# Patient Record
Sex: FEMALE | Race: WHITE | Employment: FULL TIME | URBAN - METROPOLITAN AREA
[De-identification: names, ages, dates, MRNs, and addresses within clinical notes are randomized per-mention and may not be internally consistent; named-entity substitution may affect disease eponyms.]

---

## 2022-04-03 ENCOUNTER — APPOINTMENT (OUTPATIENT)
Dept: GENERAL RADIOLOGY | Age: 27
End: 2022-04-03
Payer: COMMERCIAL

## 2022-04-03 ENCOUNTER — HOSPITAL ENCOUNTER (EMERGENCY)
Age: 27
Discharge: HOME | End: 2022-04-03
Attending: EMERGENCY MEDICINE
Payer: COMMERCIAL

## 2022-04-03 VITALS
HEIGHT: 64 IN | SYSTOLIC BLOOD PRESSURE: 120 MMHG | TEMPERATURE: 98.7 F | RESPIRATION RATE: 16 BRPM | DIASTOLIC BLOOD PRESSURE: 77 MMHG | OXYGEN SATURATION: 100 % | HEART RATE: 104 BPM

## 2022-04-03 DIAGNOSIS — S83.402A SPRAIN OF COLLATERAL LIGAMENT OF LEFT KNEE, INITIAL ENCOUNTER: Primary | ICD-10-CM

## 2022-04-03 PROCEDURE — 73564 X-RAY EXAM KNEE 4 OR MORE: CPT

## 2022-04-03 PROCEDURE — 99285 EMERGENCY DEPT VISIT HI MDM: CPT

## 2022-04-03 PROCEDURE — 6360000002 HC RX W HCPCS: Performed by: HEALTH CARE PROVIDER

## 2022-04-03 PROCEDURE — 96372 THER/PROPH/DIAG INJ SC/IM: CPT

## 2022-04-03 RX ORDER — KETOROLAC TROMETHAMINE 30 MG/ML
30 INJECTION, SOLUTION INTRAMUSCULAR; INTRAVENOUS ONCE
Status: COMPLETED | OUTPATIENT
Start: 2022-04-03 | End: 2022-04-03

## 2022-04-03 RX ADMIN — KETOROLAC TROMETHAMINE 30 MG: 30 INJECTION, SOLUTION INTRAMUSCULAR at 11:07

## 2022-04-03 ASSESSMENT — ENCOUNTER SYMPTOMS
CONSTIPATION: 0
DIARRHEA: 0
SORE THROAT: 0
NAUSEA: 0
SHORTNESS OF BREATH: 0
VOMITING: 0
ABDOMINAL PAIN: 0

## 2022-04-03 ASSESSMENT — PAIN DESCRIPTION - ORIENTATION: ORIENTATION: LEFT

## 2022-04-03 ASSESSMENT — PAIN - FUNCTIONAL ASSESSMENT: PAIN_FUNCTIONAL_ASSESSMENT: 0-10

## 2022-04-03 ASSESSMENT — PAIN DESCRIPTION - PAIN TYPE: TYPE: ACUTE PAIN

## 2022-04-03 ASSESSMENT — PAIN DESCRIPTION - LOCATION: LOCATION: KNEE

## 2022-04-03 ASSESSMENT — PAIN SCALES - GENERAL
PAINLEVEL_OUTOF10: 4
PAINLEVEL_OUTOF10: 4

## 2022-04-03 NOTE — ED PROVIDER NOTES
[facility]  ED  Emergency Department Encounter  Emergency Medicine Resident     Pt Name: Aly Alvarenga  MRN: [id number]  Ryliegfleighton 1995  Date of evaluation: 4/3/22  PCP:  Germaine Cadet MD    [address] [place] [address]       Chief Complaint   Patient presents with    Knee Pain     (L) knee pain       HISTORY OFPRESENT ILLNESS  (Location/Symptom, Timing/Onset, Context/Setting, Quality, Duration, Modifying Lynita Hush.)      Sophia Steel is a 27 y.o. female who presents with pain in the left knee. Patient was in a rugby game yesterday when she sustained an injury to her left knee after being tackled. States she initially felt a pop but was able to walk with rest.  Woke up this morning with severe swelling of the left knee and is unable to bear weight. Denies any PERRL, conjunctiva normal, weakness, paresthesias lower extremities. Denies any previous injuries to that leg or knee. PAST MEDICAL / SURGICAL / SOCIAL / FAMILY HISTORY      has no past medical history on file. Denies any past medical history     has no past surgical history on file.    Denies any past surgical history    Social History     Socioeconomic History    Marital status: Single     Spouse name: Not on file    Number of children: Not on file    Years of education: Not on file    Highest education level: Not on file   Occupational History    Not on file   Tobacco Use    Smoking status: Not on file    Smokeless tobacco: Not on file   Substance and Sexual Activity    Alcohol use: Not on file    Drug use: Not on file    Sexual activity: Not on file   Other Topics Concern    Not on file   Social History Narrative    Not on file     Social Determinants of Health     Financial Resource Strain:     Difficulty of Paying Living Expenses: Not on file   Food Insecurity:     Worried About Running Out of Food in the Last Year: Not on file    Daniel of Food in the Last Year: Not on file   Transportation Needs:     Lack of Transportation (Medical): Not on file    Lack of Transportation (Non-Medical): Not on file   Physical Activity:     Days of Exercise per Week: Not on file    Minutes of Exercise per Session: Not on file   Stress:     Feeling of Stress : Not on file   Social Connections:     Frequency of Communication with Friends and Family: Not on file    Frequency of Social Gatherings with Friends and Family: Not on file    Attends Mormonism Services: Not on file    Active Member of [address] [place] [address] or Organizations: Not on file    Attends Club or Organization Meetings: Not on file    Marital Status: Not on file   Intimate Partner Violence:     Fear of Current or Ex-Partner: Not on file    Emotionally Abused: Not on file    Physically Abused: Not on file    Sexually Abused: Not on file   Housing Stability:     Unable to Pay for Housing in the Last Year: Not on file    Number of [place] in the Last Year: Not on file    Unstable Housing in the Last Year: Not on file       History reviewed. No pertinent family history. Allergies:  Patient has no known allergies. Home Medications:  Prior to Admission medications    Not on File       REVIEW OF SYSTEMS    (2-9 systems for level 4, 10 or more for level 5)      Review of Systems   Constitutional: Negative for chills and fever. HENT: Negative for ear pain, hearing loss and sore throat. Eyes: Negative for visual disturbance. Respiratory: Negative for shortness of breath. Cardiovascular: Negative for chest pain. Gastrointestinal: Negative for abdominal pain, constipation, diarrhea, nausea and vomiting. Genitourinary: Negative for difficulty urinating and dysuria. Musculoskeletal: Negative for myalgias. Pain and swelling in left knee   Neurological: Negative for numbness. Psychiatric/Behavioral: Negative for agitation and confusion. PHYSICAL EXAM   (up to 7 for level 4, 8 or more for level 5)     INITIAL VITALS:    height is 5' 4\" (1.626 m). Her temperature is 98.7 °F (37.1 °C). Her blood pressure is 120/77 and her pulse is 104. Her respiration is 16 and oxygen saturation is 100%. Physical Exam  Vitals and nursing note reviewed. Constitutional:       General: She is not in acute distress. Appearance: Normal appearance. She is well-developed. She is not ill-appearing or diaphoretic. HENT:      Head: Normocephalic and atraumatic. Right Ear: External ear normal.      Left Ear: External ear normal.      Nose: Nose normal.      Mouth/Throat:      Mouth: Mucous membranes are moist.   Eyes:      Extraocular Movements: Extraocular movements intact. Conjunctiva/sclera: Conjunctivae normal.   Neck:      Trachea: No tracheal deviation. Cardiovascular:      Rate and Rhythm: Normal rate and regular rhythm. Pulmonary:      Effort: Pulmonary effort is normal. No respiratory distress. Abdominal:      General: Abdomen is flat. There is no distension. Musculoskeletal:         General: Swelling and tenderness present. No deformity or signs of injury. Normal range of motion. Cervical back: Normal range of motion and neck supple. Comments: Left knee swelling  Lachmen test positive for joint laxity     Skin:     General: Skin is warm and dry. Coloration: Skin is not jaundiced. Findings: No bruising or lesion. Neurological:      General: No focal deficit present. Mental Status: She is alert and oriented to person, place, and time. Mental status is at baseline. Motor: No abnormal muscle tone. DIFFERENTIAL  DIAGNOSIS     PLAN (LABS / IMAGING / EKG):  Orders Placed This Encounter   Procedures    XR KNEE LEFT (MIN 4 VIEWS)       MEDICATIONS ORDERED:  Orders Placed This Encounter   Medications    ketorolac (TORADOL) injection 30 mg       DDX: tib/fib/patella fracture, ligamentous injury    Initial MDM/Plan: 27 y.o. female who presents with pain in the left knee.  At time of initial examination, patient was in no acute distress, nontoxic appearing, and vital signs were stable. Physical exam was pertinent for significant swelling of her left knee. Neurovascularly in tact. No concerns for popliteal artery injury. Plan for XR, analgesia, crutches, JONELLE, ortho follow up. DIAGNOSTIC RESULTS / EMERGENCY DEPARTMENT COURSE / MDM     LABS:  Labs Reviewed - No data to display      RADIOLOGY:  XR KNEE LEFT (MIN 4 VIEWS)    Result Date: 4/3/2022  EXAMINATION: FOUR XRAY VIEWS OF THE LEFT KNEE 4/3/2022 10:57 am COMPARISON: None. HISTORY: ORDERING SYSTEM PROVIDED HISTORY: knee injury TECHNOLOGIST PROVIDED HISTORY: knee injury FINDINGS: Multiple views were obtained of the left knee. No gross fracture. No dislocation. Minimal medial compartment joint space loss. No gross fracture. EMERGENCY DEPARTMENT COURSE:  ED Course as of 04/03/22 1643   Sun Apr 03, 2022   1139 XR KNEE LEFT (MIN 4 VIEWS)  XR negative for any acute fractures. ABIs appropriate. Patient updated on results. Plan for discharge home at this time with crutches, Ace wrap, pain control. Patient will need follow-up with orthopedics for concerns of ligament tear of the left knee. [JS]      ED Course User Index  [JS] Jose Carlos Rice DO       PROCEDURES:  None    CONSULTS:  None    CRITICAL CARE:  Please see attending note    FINAL IMPRESSION      1.  Sprain of collateral ligament of left knee, initial encounter       DISPOSITION / PLAN     DISPOSITION Decision To Discharge 04/03/2022 11:40:23 AM    PATIENTREFERRED TO:  Jonah Blankenship MD  [address]  [phone]    Schedule an appointment as soon as possible for a visit   As needed    [facility]  [address] [address]  [phone]  Go to   As needed, If symptoms worsen    [address] [address]  Schedule an appointment as soon as possible for a visit in 1 day  For evaluation of your left knee injury. DISCHARGE MEDICATIONS:  There are no discharge medications for this patient.       Hiram Ingram DO  EmergencyMedicine Resident    (Please note that portions of this note were completed with a voice recognition program.  Efforts were made to edit the dictations but occasionally words are mis-transcribed.)     Man Zamora DO  Resident  04/03/22 3014

## 2022-04-03 NOTE — ED NOTES
Pt. To ER room 10 via wheelchair from triage  Pt. Presents with (L) knee pain s/p injury from playing rugby yesterday. Pt. States she felt her knee pop and then had episodes of buckling yesterday evening. When pt woke up this morning she was unable to bend her knee and is having trouble bearing weight  Pt.  Denies all other injuries  Vitals stable  Awaiting orders  Will continue to monitor      Zoë Myrick RN  04/03/22 1030

## 2022-04-03 NOTE — ED PROVIDER NOTES
complaint of the pain. Rugby injury last night tackled someone and then someone landed on top of her. Had difficulty bearing weight since this morning and noticed increased swelling. No other injuries. Physical:     height is 5' 4\" (1.626 m). Her temperature is 98.7 °F (37.1 °C). Her blood pressure is 120/77 and her pulse is 104. Her respiration is 16 and oxygen saturation is 100%. 27 y.o. female no acute distress, there is mild to moderate effusion of the knee, negative anterior drawer but there is some increased laxity on Lachman testing normal varus and valgus movement on stress. Popliteal pulse and DP pulse 2+ bilaterally.   Capillary refill less than 2 seconds below the knee    Impression: Knee injury    Plan: X-ray, JONELLE, symptomatic treatment, follow-up with orthopedics      Logan Li MD, [facility] [place]  Attending Emergency Physician         Sonja Gar MD  04/03/22 0785

## 2022-04-05 ENCOUNTER — TELEPHONE (OUTPATIENT)
Dept: ORTHOPEDIC SURGERY | Age: 27
End: 2022-04-05

## 2022-04-05 ENCOUNTER — OFFICE VISIT (OUTPATIENT)
Dept: ORTHOPEDIC SURGERY | Age: 27
End: 2022-04-05
Payer: COMMERCIAL

## 2022-04-05 VITALS — HEIGHT: 64 IN

## 2022-04-05 DIAGNOSIS — S83.005A DISLOCATION OF LEFT PATELLA, INITIAL ENCOUNTER: Primary | ICD-10-CM

## 2022-04-05 PROCEDURE — 99202 OFFICE O/P NEW SF 15 MIN: CPT | Performed by: ORTHOPAEDIC SURGERY

## 2022-04-05 RX ORDER — LEVOTHYROXINE SODIUM 0.1 MG/1
50 TABLET ORAL DAILY
COMMUNITY

## 2022-04-05 RX ORDER — DEXTROAMPHETAMINE SACCHARATE, AMPHETAMINE ASPARTATE MONOHYDRATE, DEXTROAMPHETAMINE SULFATE AND AMPHETAMINE SULFATE 2.5; 2.5; 2.5; 2.5 MG/1; MG/1; MG/1; MG/1
10 CAPSULE, EXTENDED RELEASE ORAL EVERY MORNING
COMMUNITY

## 2022-04-05 NOTE — TELEPHONE ENCOUNTER
Mom came in this afternoon, patient has work note to return to work this Thursday. Mom and patient are asking that work note be extended until next visit. Please call patient, so mom can  work note.

## 2022-04-05 NOTE — LETTER
[facility]  [address] [facility]  Phone: [phone]  Fax: [phone]    Jasmine Lynne MD        April 5, 2022     Patient: Papito Sandhu   YOB: 1995   Date of Visit: 4/5/2022       To Whom It May Concern: It is my medical opinion that Sophia Sandhu may return to work on 04/07/2022 with restrictions of light duty, she is advised to remain at a seated position with light walking as needed. Please excuse her for the date of 04/04/2022. She will be re-evaluated in 2 weeks at my clinic. If you have any questions or concerns, please don't hesitate to call.     Sincerely,        Jasmine Lynne MD

## 2022-04-05 NOTE — PROGRESS NOTES
Chief Complaint   Patient presents with    Knee Injury     left knee injury sunday during rugby game; felt knee pop when another patient landed on her leg   This 46-year-old patient is seen here for an injury she sustained to her left knee on Saturday, 4/2/2022. She was playing rugby and twisted the knee were open and fell onto weight. She had a loud painful pop. She was not able to continue playing. It was next day that she had noticed she could not put any weight on that left leg. Before that she was weightbearing. Also noticed some swelling next day of the knee. She says it has subsided since then. Patient denies having had any previous problems with the knee even when she was a teenager. Since the injury she has been having hard time trying to put any weight on the leg she has been using a knee brace as well as 2 crutches to ambulate. Examination: In supine position she had a hard time bending the knee. There was very minimal effusion. Most of her tenderness was over the MPFL. She did have some tenderness over the medial joint line. Patellar apprehension test was very strongly positive. Lachman test is negative. She did have a significant difficulty trying to flex it beyond about degrees. X-rays: I reviewed the x-rays that showed no fractures or dislocation. In the sunrise view there is a very sharp angle at the medial side and almost flat on the lateral side. Diagnosis: Acute patellar dislocation left knee. Treatment: Given her knee immobilizer. She can take it out to wash herself. She can be weightbearing as tolerated. She has been instructed in quad setting exercises. We will see her again in 2 weeks time. In the meantime patient is not able to get back to work but she will evaluate herself in a week's time if she wants to go back again we will give her a note to that effect. Next visit no x-rays are needed. Is only for clinical evaluation.   She is not doing better then we will start her on physical therapy.

## 2022-04-06 NOTE — TELEPHONE ENCOUNTER
Patient came in to the office to get letter mom requested for patient, she decided to void that letter and return to work on 04/11/2022 with light duty restrictions that was detailed in previous letter given to patient plus will be addressed on FMLA forms sent to HR.

## 2022-04-18 ENCOUNTER — OFFICE VISIT (OUTPATIENT)
Dept: ORTHOPEDIC SURGERY | Age: 27
End: 2022-04-18
Payer: COMMERCIAL

## 2022-04-18 VITALS — BODY MASS INDEX: 25.61 KG/M2 | HEIGHT: 64 IN | WEIGHT: 150 LBS

## 2022-04-18 DIAGNOSIS — S83.242D ACUTE MEDIAL MENISCUS TEAR OF LEFT KNEE, SUBSEQUENT ENCOUNTER: Primary | ICD-10-CM

## 2022-04-18 PROCEDURE — 99212 OFFICE O/P EST SF 10 MIN: CPT | Performed by: ORTHOPAEDIC SURGERY

## 2022-04-18 NOTE — PROGRESS NOTES
Chief Complaint   Patient presents with    Follow-up     Left patallar dislocation 04/02/2022   This patient who had thought initially that she had an acute dislocation of the patella is returning here for follow-up. The patient is given up her crutches. She has been using a knee immobilizer. She says the pain is better. Out of the brace the pain is located over the posteromedial aspect of the knee. She also mentioned that there was some bruising over the posteromedial aspect and some over the anteromedial aspect of the tibia. Anteromedial bruise is still slightly visible. Position there was no effusion and today definitely had tenderness over the course of the posteromedial joint line. 1 position there was no ecchymosis. Flexion of the knee against resistance was normal and painless. The knee is stable. Diagnosis: Medial meniscal tear left knee. Treatment: Arranging for her to have an MRI of the knee. In the meantime she will start quadricep strengthening exercises treatment is definitely about 1.5 cm wasting of the left quadriceps. She may discontinue the immobilizer and this and see her again after the MRI.

## 2022-04-20 ENCOUNTER — TELEPHONE (OUTPATIENT)
Dept: ORTHOPEDIC SURGERY | Age: 27
End: 2022-04-20

## 2022-04-21 ENCOUNTER — TELEPHONE (OUTPATIENT)
Dept: ORTHOPEDIC SURGERY | Age: 27
End: 2022-04-21

## 2022-04-21 NOTE — TELEPHONE ENCOUNTER
Called patient to go over new set of FMLA forms faxed to the office  On 04/21/2022 she stated was going to return to work on 04/22/2022 since that was the original date of return on previous forms. She  did schedule a follow up with Dr. Krish Deutsch to go over the MRI results as well. She was a little reluctant to do so.

## 2022-04-27 ENCOUNTER — OFFICE VISIT (OUTPATIENT)
Dept: ORTHOPEDIC SURGERY | Age: 27
End: 2022-04-27
Payer: COMMERCIAL

## 2022-04-27 VITALS — HEIGHT: 64 IN | BODY MASS INDEX: 25.61 KG/M2 | WEIGHT: 150 LBS

## 2022-04-27 DIAGNOSIS — S83.512D COMPLETE TEAR OF ANTERIOR CRUCIATE LIGAMENT OF LEFT KNEE, SUBSEQUENT ENCOUNTER: Primary | ICD-10-CM

## 2022-04-27 PROCEDURE — 99213 OFFICE O/P EST LOW 20 MIN: CPT | Performed by: ORTHOPAEDIC SURGERY

## 2022-04-27 NOTE — PROGRESS NOTES
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 She had injured her knee in the rugby game. This was about 3 weeks ago. Patient says that she still having some pain on the medial side. She has difficulty doing any swimming activity. She has difficulty with the stairs and also walking long distance. Examination: There was no effusion in the joint she has full motion. Lachman appears positive today. There was some tenderness over the lateral side currently only at 1 time if she has significant pain on palpating the fibular head. At other times withdraws palpating the fibular head she responded with having no pain. X-rays: I went over the MRI findings with the patient showing complete tear of the anterior cruciate ligament minor tear of the anterior horn of the lateral meniscus and contusion of the tibia and posterolateral corner injury. I went over this explaining it on the plastic model. Diagnosis: Anterior cruciate ligament tear left knee. Posterolateral corner injury left knee. Treatment: Discussed about surgical treatment but at the present time she cannot take any time off. She lives on her own. She wants to get back to work as soon as possible. We did go into the prognosis for developing osteoarthritis dated down the line. In the meantime I have given her an ACL brace with 5 degree extension block. I will see her in 3 weeks and hopefully by that time she should be able to return to work. In the meantime avoid all strenuous activities.

## 2022-05-11 ENCOUNTER — OFFICE VISIT (OUTPATIENT)
Dept: ORTHOPEDIC SURGERY | Age: 27
End: 2022-05-11
Payer: COMMERCIAL

## 2022-05-11 VITALS — BODY MASS INDEX: 25.61 KG/M2 | WEIGHT: 150 LBS | HEIGHT: 64 IN

## 2022-05-11 DIAGNOSIS — S83.512D COMPLETE TEAR OF ANTERIOR CRUCIATE LIGAMENT OF LEFT KNEE, SUBSEQUENT ENCOUNTER: Primary | ICD-10-CM

## 2022-05-11 PROCEDURE — 99213 OFFICE O/P EST LOW 20 MIN: CPT | Performed by: ORTHOPAEDIC SURGERY

## 2022-05-11 NOTE — PROGRESS NOTES
Chief Complaint   Patient presents with    Knee Pain     LEFT - ACL TEAR - DISCUSS SURGICAL OPTIONS    With previously diagnosed with ACL tear is seen here because she has made arrangements so that she could go ahead with surgical treatment. At her last visit we had offered surgery but she says she could not get that done right away because she was on her own and had to continue working. I therefore had prescribed an ACL brace. She says she went to the And he told her that he prescribes the brace only postoperatively. He did not take any measurement. The patient found out who the person was being called his office and the person who had seen her was not there and spoke to another orthotist who did agree that she could have the ACL brace preoperatively. And made arrangements to have him see her this afternoon to be fitted with the brace. However then the patient informed me that her sister is going to stay with her and help out and therefore she could go ahead with the surgery and therefore I told her that she does not need to get the ACL brace at this stage. This is because postoperatively I usually do not use a knee brace. Then went through the details of the procedure explaining that difference in the aloe and autograft. We went through her postoperative management. She should be able to return to work in about 4 weeks. This is barring any complications. She will be scheduled for ACL reconstruction using autograft.

## 2022-05-25 ENCOUNTER — ANESTHESIA EVENT (OUTPATIENT)
Dept: OPERATING ROOM | Age: 27
End: 2022-05-25
Payer: COMMERCIAL

## 2022-05-26 ENCOUNTER — HOSPITAL ENCOUNTER (OUTPATIENT)
Age: 27
Setting detail: OUTPATIENT SURGERY
Discharge: HOME | End: 2022-05-26
Attending: ORTHOPAEDIC SURGERY | Admitting: ORTHOPAEDIC SURGERY
Payer: COMMERCIAL

## 2022-05-26 ENCOUNTER — ANESTHESIA (OUTPATIENT)
Dept: OPERATING ROOM | Age: 27
End: 2022-05-26
Payer: COMMERCIAL

## 2022-05-26 VITALS
DIASTOLIC BLOOD PRESSURE: 74 MMHG | SYSTOLIC BLOOD PRESSURE: 117 MMHG | RESPIRATION RATE: 26 BRPM | BODY MASS INDEX: 26.22 KG/M2 | HEART RATE: 105 BPM | TEMPERATURE: 96.8 F | HEIGHT: 63 IN | OXYGEN SATURATION: 99 % | WEIGHT: 148 LBS

## 2022-05-26 LAB — HCG, PREGNANCY URINE (POC): NEGATIVE

## 2022-05-26 PROCEDURE — 2500000003 HC RX 250 WO HCPCS: Performed by: ANESTHESIOLOGY

## 2022-05-26 PROCEDURE — 2580000003 HC RX 258: Performed by: ANESTHESIOLOGY

## 2022-05-26 PROCEDURE — 6360000002 HC RX W HCPCS: Performed by: ANESTHESIOLOGY

## 2022-05-26 PROCEDURE — 81025 URINE PREGNANCY TEST: CPT

## 2022-05-26 PROCEDURE — 64447 NJX AA&/STRD FEMORAL NRV IMG: CPT | Performed by: ANESTHESIOLOGY

## 2022-05-26 RX ORDER — BUPIVACAINE HYDROCHLORIDE 5 MG/ML
INJECTION, SOLUTION EPIDURAL; INTRACAUDAL
Status: COMPLETED | OUTPATIENT
Start: 2022-05-26 | End: 2022-05-26

## 2022-05-26 RX ORDER — MIDAZOLAM HYDROCHLORIDE 2 MG/2ML
2 INJECTION, SOLUTION INTRAMUSCULAR; INTRAVENOUS ONCE
Status: COMPLETED | OUTPATIENT
Start: 2022-05-26 | End: 2022-05-26

## 2022-05-26 RX ORDER — FENTANYL CITRATE 50 UG/ML
100 INJECTION, SOLUTION INTRAMUSCULAR; INTRAVENOUS ONCE
Status: COMPLETED | OUTPATIENT
Start: 2022-05-26 | End: 2022-05-26

## 2022-05-26 RX ORDER — SODIUM CHLORIDE, SODIUM LACTATE, POTASSIUM CHLORIDE, CALCIUM CHLORIDE 600; 310; 30; 20 MG/100ML; MG/100ML; MG/100ML; MG/100ML
INJECTION, SOLUTION INTRAVENOUS CONTINUOUS
Status: DISCONTINUED | OUTPATIENT
Start: 2022-05-26 | End: 2022-05-26 | Stop reason: HOSPADM

## 2022-05-26 RX ADMIN — BUPIVACAINE HYDROCHLORIDE 20 ML: 5 INJECTION, SOLUTION EPIDURAL; INTRACAUDAL; PERINEURAL at 07:56

## 2022-05-26 RX ADMIN — SODIUM CHLORIDE, POTASSIUM CHLORIDE, SODIUM LACTATE AND CALCIUM CHLORIDE: 600; 310; 30; 20 INJECTION, SOLUTION INTRAVENOUS at 07:22

## 2022-05-26 RX ADMIN — MIDAZOLAM HYDROCHLORIDE 2 MG: 1 INJECTION, SOLUTION INTRAMUSCULAR; INTRAVENOUS at 07:56

## 2022-05-26 RX ADMIN — FENTANYL CITRATE 100 MCG: 50 INJECTION INTRAMUSCULAR; INTRAVENOUS at 07:56

## 2022-05-26 NOTE — ANESTHESIA PROCEDURE NOTES
Peripheral Block    Patient location during procedure: pre-op  Start time: 5/26/2022 7:56 AM  End time: 5/26/2022 8:01 AM  Staffing  Performed: anesthesiologist   Anesthesiologist: Brandon Herrera MD  Preanesthetic Checklist  Completed: patient identified, IV checked, site marked, risks and benefits discussed, surgical/procedural consents, equipment checked, pre-op evaluation, timeout performed, anesthesia consent given, oxygen available, monitors applied/VS acknowledged, fire risk safety assessment completed and verbalized and blood product R/B/A discussed and consented  Peripheral Block  Patient position: supine  Prep: ChloraPrep  Patient monitoring: cardiac monitor, continuous pulse ox and IV access  Block type: Femoral  Laterality: left  Injection technique: single-shot  Guidance: ultrasound guided  Local infiltration: bupivacaine  Infiltration strength: 0.13 %  Dose: 10 mL  Provider prep: mask and sterile gloves  Local infiltration: bupivacaine  Needle  Needle type: short-bevel   Needle gauge: 22 G  Needle length: 10 cm  Needle localization: ultrasound guidance  Test dose: negative  Assessment  Injection assessment: negative aspiration for heme, no paresthesia on injection and local visualized surrounding nerve on ultrasound  Slow fractionated injection: yes  Hemodynamics: stableOutcomes: uncomplicated and patient tolerated procedure well  Medications Administered  Bupivacaine (MARCAINE) PF injection 0.5%, 20 mL  Reason for block: post-op pain management and at surgeon's request

## 2022-05-26 NOTE — H&P
Surgical History and Physical Exam    Reason for surgery:  The patient is a 27 y.o. female with left anterior cruciate ligament injury     Past Medical History:    Past Medical History:   Diagnosis Date    ACL tear 05/2022    LT   Dr. Rica Gale Wears contact lenses     Wears glasses     Wellness examination     PCP Dr Ani Landry /  [place] / last visit unknown     Past Surgical History:    History reviewed. No pertinent surgical history. Medications Prior to Admission:   Prior to Admission medications    Medication Sig Start Date End Date Taking? Authorizing Provider   levothyroxine (SYNTHROID) 100 MCG tablet Take 50 mcg by mouth Daily     Historical Provider, MD   amphetamine-dextroamphetamine (ADDERALL XR) 10 MG extended release capsule Take 10 mg by mouth every morning. Historical Provider, MD     Allergies:    Patient has no known allergies. Social History:   Social History     Socioeconomic History    Marital status: Single     Spouse name: None    Number of children: None    Years of education: None    Highest education level: None   Occupational History    None   Tobacco Use    Smoking status: Never Smoker    Smokeless tobacco: Never Used   Vaping Use    Vaping Use: Never used   Substance and Sexual Activity    Alcohol use: Not Currently    Drug use: Never    Sexual activity: None   Other Topics Concern    None   Social History Narrative    None     Social Determinants of Health     Financial Resource Strain:     Difficulty of Paying Living Expenses: Not on file   Food Insecurity:     Worried About Running Out of Food in the Last Year: Not on file    Daniel of Food in the Last Year: Not on file   Transportation Needs:     Lack of Transportation (Medical): Not on file    Lack of Transportation (Non-Medical):  Not on file   Physical Activity:     Days of Exercise per Week: Not on file    Minutes of Exercise per Session: Not on file   Stress:     Feeling of Stress : Not on file   Social Connections:     Frequency of Communication with Friends and Family: Not on file    Frequency of Social Gatherings with Friends and Family: Not on file    Attends Buddhism Services: Not on file    Active Member of Clubs or Organizations: Not on file    Attends Club or Organization Meetings: Not on file    Marital Status: Not on file   Intimate Partner Violence:     Fear of Current or Ex-Partner: Not on file    Emotionally Abused: Not on file    Physically Abused: Not on file    Sexually Abused: Not on file   Housing Stability:     Unable to Pay for Housing in the Last Year: Not on file    Number of [place] in the Last Year: Not on file    Unstable Housing in the Last Year: Not on file     Family History:  Family History   Problem Relation Age of Onset    No Known Problems Mother     High Blood Pressure Father     Arthritis Father        REVIEW OF SYSTEMS:  Constitutional: Negative for fever and chills. Musculoskeletal: Positive for myalgias and joint pain. Skin: Negative for itching and rash. Neurological: Negative for dizziness, sensory change and headaches. Psychiatric/Behavioral: Negative for depression and suicidal ideas. PHYSICAL EXAM:  Blood pressure 110/77, pulse 79, temperature 96.8 °F (36 °C), temperature source Temporal, resp. rate 22, height 5' 3\" (1.6 m), weight 148 lb (67.1 kg), SpO2 98 %. Gen: alert and oriented, NAD, cooperative  Head: normocephalic atraumatic   Cardiovascular: Regular rate, no dependent edema, distal pulses 2+  Respiratory: Chest symmetric, no accessory muscle use, normal respirations  MSK: Left lower extremity: tenderness to palpation about left knee. N-V intact distally    A/P: 27 y.o. female  was evaluated and after discussion surgical and non surgical options, the patient has decided to undergo left anterior cruciate ligament reconstructions. Consent obtained and in chart. All questions answered appropriately. Surgical risks including but not limited to: bleeding, blood clots, infection, damage to surrounding tissues/nerves/vessels, failure of fixation, failure of wounds to heal, loss of motion, stiffness, dislocation, postoperative pain, recurrence of symptoms, need for future surgery,  risks of anesthesia, loss of limb and loss of life were all discussed with the patient. Knowing these risks, the patient wishes to proceed with surgery. -Abx OCTOR  -Site marked, Consent in chart  -AC held  -NPO since midnight  -All question answered.

## 2022-05-26 NOTE — ANESTHESIA PRE PROCEDURE
Department of Anesthesiology  Preprocedure Note       Name:  Real Lawler   Age:  27 y. o.  :  1995                                          MRN:  [id number]         Date:  2022      Surgeon: Renetta Mahajan):  Ren Brock MD    Procedure: Procedure(s):  ARTHROSCOPIC KNEE ACL RECONSTRUCTION WITH BONE TENDON BONE AUTOGRAFT  (ARTHREX, FEMORAL NERVE BLOCK PRE-OP, 3080 TABLE, SUPINE)    Medications prior to admission:   Prior to Admission medications    Medication Sig Start Date End Date Taking? Authorizing Provider   levothyroxine (SYNTHROID) 100 MCG tablet Take 50 mcg by mouth Daily     Historical Provider, MD   amphetamine-dextroamphetamine (ADDERALL XR) 10 MG extended release capsule Take 10 mg by mouth every morning. Historical Provider, MD       Current medications:    No current facility-administered medications for this encounter. Allergies:  No Known Allergies    Problem List:    Patient Active Problem List   Diagnosis Code    Dislocation of left patella S83.005A       Past Medical History:        Diagnosis Date    ACL tear 2022    LT   Dr. Ching Franks Thyroid disease     Wears contact lenses     Wears glasses     Wellness examination     PCP Dr Clint Muhammad /  [place] / last visit unknown       Past Surgical History:  History reviewed. No pertinent surgical history.     Social History:    Social History     Tobacco Use    Smoking status: Never Smoker    Smokeless tobacco: Never Used   Substance Use Topics    Alcohol use: Not Currently                                Counseling given: Not Answered      Vital Signs (Current):   Vitals:    22 0950   Weight: 148 lb (67.1 kg)   Height: 5' 3\" (1.6 m)                                              BP Readings from Last 3 Encounters:   22 120/77       NPO Status:                                                                                 BMI:   Wt Readings from Last 3 Encounters:   22 148 lb (67.1 kg)   22 150 lb (68 kg)   04/27/22 150 lb (68 kg)     Body mass index is 26.22 kg/m². CBC:   Lab Results   Component Value Date    WBC 9.0 03/06/2013    RBC 4.51 03/06/2013    HGB 12.5 03/06/2013    HCT 37.7 03/06/2013    MCV 83.7 03/06/2013    RDW 14.9 03/06/2013     03/06/2013       CMP:   Lab Results   Component Value Date     03/06/2013    K 4.0 03/06/2013     03/06/2013    CO2 29 03/06/2013    BUN 16 03/06/2013    CREATININE 0.88 03/06/2013    GFRAA NOT REPORTED 03/06/2013    LABGLOM  03/06/2013     Pediatric GFR requires additional information. Refer to [facility] website for       POC Tests: No results for input(s): POCGLU, POCNA, POCK, POCCL, POCBUN, POCHEMO, POCHCT in the last 72 hours. Coags: No results found for: PROTIME, INR, APTT    HCG (If Applicable): No results found for: PREGTESTUR, PREGSERUM, HCG, HCGQUANT     ABGs: No results found for: PHART, PO2ART, DVL6ZUV, BSW1AEI, BEART, [id number]     Type & Screen (If Applicable):  No results found for: LABABO, LABRH    Drug/Infectious Status (If Applicable):  No results found for: HIV, HEPCAB    COVID-19 Screening (If Applicable): No results found for: COVID19        Anesthesia Evaluation  Patient summary reviewed and Nursing notes reviewed no history of anesthetic complications:   Airway: Mallampati: I  TM distance: >3 FB   Neck ROM: full  Mouth opening: > = 3 FB   Dental: normal exam         Pulmonary:Negative Pulmonary ROS and normal exam                               Cardiovascular:Negative CV ROS                      Neuro/Psych:   Negative Neuro/Psych ROS              GI/Hepatic/Renal: Neg GI/Hepatic/Renal ROS            Endo/Other:    (+) hypothyroidism::., .                 Abdominal:             Vascular: Other Findings:           Anesthesia Plan      general and regional     ASA 2       Induction: intravenous. MIPS: Postoperative opioids intended and Prophylactic antiemetics administered.   Anesthetic plan and risks discussed with patient. Plan discussed with CRNA.                     Chelsea Boles MD   5/26/2022

## 2022-05-27 ENCOUNTER — HOSPITAL ENCOUNTER (OUTPATIENT)
Age: 27
Setting detail: OUTPATIENT SURGERY
Discharge: HOME | End: 2022-05-27
Attending: ORTHOPAEDIC SURGERY | Admitting: ORTHOPAEDIC SURGERY
Payer: COMMERCIAL

## 2022-05-27 ENCOUNTER — ANESTHESIA (OUTPATIENT)
Dept: OPERATING ROOM | Age: 27
End: 2022-05-27
Payer: COMMERCIAL

## 2022-05-27 ENCOUNTER — ANESTHESIA EVENT (OUTPATIENT)
Dept: OPERATING ROOM | Age: 27
End: 2022-05-27
Payer: COMMERCIAL

## 2022-05-27 VITALS
TEMPERATURE: 97.2 F | BODY MASS INDEX: 26.22 KG/M2 | DIASTOLIC BLOOD PRESSURE: 78 MMHG | RESPIRATION RATE: 16 BRPM | HEIGHT: 63 IN | WEIGHT: 148 LBS | HEART RATE: 71 BPM | OXYGEN SATURATION: 99 % | SYSTOLIC BLOOD PRESSURE: 119 MMHG

## 2022-05-27 DIAGNOSIS — Z98.890 S/P ACL RECONSTRUCTION: Primary | ICD-10-CM

## 2022-05-27 PROCEDURE — 29888 ARTHRS AID ACL RPR/AGMNTJ: CPT | Performed by: ORTHOPAEDIC SURGERY

## 2022-05-27 PROCEDURE — 6360000002 HC RX W HCPCS: Performed by: ANESTHESIOLOGY

## 2022-05-27 PROCEDURE — 2500000003 HC RX 250 WO HCPCS

## 2022-05-27 PROCEDURE — 2500000003 HC RX 250 WO HCPCS: Performed by: ANESTHESIOLOGY

## 2022-05-27 PROCEDURE — 3700000000 HC ANESTHESIA ATTENDED CARE: Performed by: ORTHOPAEDIC SURGERY

## 2022-05-27 PROCEDURE — 7100000001 HC PACU RECOVERY - ADDTL 15 MIN: Performed by: ORTHOPAEDIC SURGERY

## 2022-05-27 PROCEDURE — 2720000010 HC SURG SUPPLY STERILE: Performed by: ORTHOPAEDIC SURGERY

## 2022-05-27 PROCEDURE — C1713 ANCHOR/SCREW BN/BN,TIS/BN: HCPCS | Performed by: ORTHOPAEDIC SURGERY

## 2022-05-27 PROCEDURE — 3600000004 HC SURGERY LEVEL 4 BASE: Performed by: ORTHOPAEDIC SURGERY

## 2022-05-27 PROCEDURE — 7100000010 HC PHASE II RECOVERY - FIRST 15 MIN: Performed by: ORTHOPAEDIC SURGERY

## 2022-05-27 PROCEDURE — 7100000011 HC PHASE II RECOVERY - ADDTL 15 MIN: Performed by: ORTHOPAEDIC SURGERY

## 2022-05-27 PROCEDURE — 3700000001 HC ADD 15 MINUTES (ANESTHESIA): Performed by: ORTHOPAEDIC SURGERY

## 2022-05-27 PROCEDURE — 6370000000 HC RX 637 (ALT 250 FOR IP): Performed by: ANESTHESIOLOGY

## 2022-05-27 PROCEDURE — 2580000003 HC RX 258: Performed by: ANESTHESIOLOGY

## 2022-05-27 PROCEDURE — 2709999900 HC NON-CHARGEABLE SUPPLY: Performed by: ORTHOPAEDIC SURGERY

## 2022-05-27 PROCEDURE — 76942 ECHO GUIDE FOR BIOPSY: CPT | Performed by: ANESTHESIOLOGY

## 2022-05-27 PROCEDURE — 2580000003 HC RX 258: Performed by: ORTHOPAEDIC SURGERY

## 2022-05-27 PROCEDURE — 3600000014 HC SURGERY LEVEL 4 ADDTL 15MIN: Performed by: ORTHOPAEDIC SURGERY

## 2022-05-27 PROCEDURE — 7100000000 HC PACU RECOVERY - FIRST 15 MIN: Performed by: ORTHOPAEDIC SURGERY

## 2022-05-27 PROCEDURE — 6360000002 HC RX W HCPCS

## 2022-05-27 DEVICE — SYSTEM FIX BNE TEND BNE W/ 10MM FLIPCUTTER II TIGHTROPE: Type: IMPLANTABLE DEVICE | Site: KNEE | Status: FUNCTIONAL

## 2022-05-27 DEVICE — SCREW INTFR L20MM DIA8MM VENT BIOCOMPOSITE FASTTHREAD: Type: IMPLANTABLE DEVICE | Site: KNEE | Status: FUNCTIONAL

## 2022-05-27 DEVICE — DEVICE GRFT FIX 4.75X19.1 MM BIOCOMPOSITE SWIVELOCK: Type: IMPLANTABLE DEVICE | Site: KNEE | Status: FUNCTIONAL

## 2022-05-27 RX ORDER — MIDAZOLAM HYDROCHLORIDE 2 MG/2ML
2 INJECTION, SOLUTION INTRAMUSCULAR; INTRAVENOUS ONCE
Status: COMPLETED | OUTPATIENT
Start: 2022-05-27 | End: 2022-05-27

## 2022-05-27 RX ORDER — CYCLOBENZAPRINE HCL 10 MG
10 TABLET ORAL 3 TIMES DAILY PRN
Qty: 30 TABLET | Refills: 0 | Status: SHIPPED | OUTPATIENT
Start: 2022-05-27 | End: 2022-05-27 | Stop reason: SDUPTHER

## 2022-05-27 RX ORDER — MIDAZOLAM HYDROCHLORIDE 2 MG/2ML
1 INJECTION, SOLUTION INTRAMUSCULAR; INTRAVENOUS ONCE
Status: DISCONTINUED | OUTPATIENT
Start: 2022-05-27 | End: 2022-05-27 | Stop reason: HOSPADM

## 2022-05-27 RX ORDER — PHENYLEPHRINE HCL IN 0.9% NACL 0.5 MG/5ML
SYRINGE (ML) INTRAVENOUS PRN
Status: DISCONTINUED | OUTPATIENT
Start: 2022-05-27 | End: 2022-05-27 | Stop reason: SDUPTHER

## 2022-05-27 RX ORDER — SODIUM CHLORIDE 0.9 % (FLUSH) 0.9 %
5-40 SYRINGE (ML) INJECTION EVERY 12 HOURS SCHEDULED
Status: DISCONTINUED | OUTPATIENT
Start: 2022-05-27 | End: 2022-05-27 | Stop reason: HOSPADM

## 2022-05-27 RX ORDER — OXYCODONE HYDROCHLORIDE 5 MG/1
5-10 TABLET ORAL
Qty: 30 TABLET | Refills: 0 | Status: SHIPPED | OUTPATIENT
Start: 2022-05-27 | End: 2022-06-03

## 2022-05-27 RX ORDER — SODIUM CHLORIDE, SODIUM LACTATE, POTASSIUM CHLORIDE, CALCIUM CHLORIDE 600; 310; 30; 20 MG/100ML; MG/100ML; MG/100ML; MG/100ML
INJECTION, SOLUTION INTRAVENOUS CONTINUOUS
Status: DISCONTINUED | OUTPATIENT
Start: 2022-05-27 | End: 2022-05-27 | Stop reason: HOSPADM

## 2022-05-27 RX ORDER — MAGNESIUM HYDROXIDE 1200 MG/15ML
LIQUID ORAL CONTINUOUS PRN
Status: DISCONTINUED | OUTPATIENT
Start: 2022-05-27 | End: 2022-05-27 | Stop reason: HOSPADM

## 2022-05-27 RX ORDER — NAPROXEN 500 MG/1
500 TABLET ORAL 2 TIMES DAILY WITH MEALS
Qty: 28 TABLET | Refills: 0 | Status: SHIPPED | OUTPATIENT
Start: 2022-05-27 | End: 2022-05-27 | Stop reason: SDUPTHER

## 2022-05-27 RX ORDER — NAPROXEN 500 MG/1
500 TABLET ORAL 2 TIMES DAILY WITH MEALS
Qty: 28 TABLET | Refills: 0 | Status: SHIPPED | OUTPATIENT
Start: 2022-05-27

## 2022-05-27 RX ORDER — PROPOFOL 10 MG/ML
INJECTION, EMULSION INTRAVENOUS PRN
Status: DISCONTINUED | OUTPATIENT
Start: 2022-05-27 | End: 2022-05-27 | Stop reason: SDUPTHER

## 2022-05-27 RX ORDER — FENTANYL CITRATE 50 UG/ML
INJECTION, SOLUTION INTRAMUSCULAR; INTRAVENOUS PRN
Status: DISCONTINUED | OUTPATIENT
Start: 2022-05-27 | End: 2022-05-27 | Stop reason: SDUPTHER

## 2022-05-27 RX ORDER — MEPERIDINE HYDROCHLORIDE 50 MG/ML
12.5 INJECTION INTRAMUSCULAR; INTRAVENOUS; SUBCUTANEOUS EVERY 5 MIN PRN
Status: DISCONTINUED | OUTPATIENT
Start: 2022-05-27 | End: 2022-05-27 | Stop reason: HOSPADM

## 2022-05-27 RX ORDER — GABAPENTIN 100 MG/1
100 CAPSULE ORAL 3 TIMES DAILY
Qty: 30 CAPSULE | Refills: 0 | Status: SHIPPED | OUTPATIENT
Start: 2022-05-27 | End: 2022-06-06

## 2022-05-27 RX ORDER — FENTANYL CITRATE 50 UG/ML
50 INJECTION, SOLUTION INTRAMUSCULAR; INTRAVENOUS ONCE
Status: COMPLETED | OUTPATIENT
Start: 2022-05-27 | End: 2022-05-27

## 2022-05-27 RX ORDER — ROCURONIUM BROMIDE 10 MG/ML
INJECTION, SOLUTION INTRAVENOUS PRN
Status: DISCONTINUED | OUTPATIENT
Start: 2022-05-27 | End: 2022-05-27 | Stop reason: SDUPTHER

## 2022-05-27 RX ORDER — GLYCOPYRROLATE 0.2 MG/ML
INJECTION INTRAMUSCULAR; INTRAVENOUS PRN
Status: DISCONTINUED | OUTPATIENT
Start: 2022-05-27 | End: 2022-05-27 | Stop reason: SDUPTHER

## 2022-05-27 RX ORDER — LIDOCAINE HYDROCHLORIDE 10 MG/ML
1 INJECTION, SOLUTION EPIDURAL; INFILTRATION; INTRACAUDAL; PERINEURAL
Status: DISCONTINUED | OUTPATIENT
Start: 2022-05-27 | End: 2022-05-27 | Stop reason: HOSPADM

## 2022-05-27 RX ORDER — ACETAMINOPHEN 500 MG
1000 TABLET ORAL EVERY 6 HOURS PRN
Qty: 112 TABLET | Refills: 0 | Status: SHIPPED | OUTPATIENT
Start: 2022-05-27 | End: 2022-05-27 | Stop reason: SDUPTHER

## 2022-05-27 RX ORDER — DEXAMETHASONE SODIUM PHOSPHATE 10 MG/ML
INJECTION INTRAMUSCULAR; INTRAVENOUS PRN
Status: DISCONTINUED | OUTPATIENT
Start: 2022-05-27 | End: 2022-05-27 | Stop reason: SDUPTHER

## 2022-05-27 RX ORDER — OXYCODONE HYDROCHLORIDE 5 MG/1
5-10 TABLET ORAL
Qty: 30 TABLET | Refills: 0 | Status: SHIPPED | OUTPATIENT
Start: 2022-05-27 | End: 2022-05-27 | Stop reason: SDUPTHER

## 2022-05-27 RX ORDER — BUPIVACAINE HYDROCHLORIDE 5 MG/ML
40 INJECTION, SOLUTION EPIDURAL; INTRACAUDAL ONCE
Status: DISCONTINUED | OUTPATIENT
Start: 2022-05-27 | End: 2022-05-27

## 2022-05-27 RX ORDER — BUPIVACAINE HYDROCHLORIDE 5 MG/ML
40 INJECTION, SOLUTION EPIDURAL; INTRACAUDAL ONCE
Status: DISCONTINUED | OUTPATIENT
Start: 2022-05-27 | End: 2022-05-27 | Stop reason: HOSPADM

## 2022-05-27 RX ORDER — SODIUM CHLORIDE 9 MG/ML
INJECTION, SOLUTION INTRAVENOUS PRN
Status: DISCONTINUED | OUTPATIENT
Start: 2022-05-27 | End: 2022-05-27 | Stop reason: HOSPADM

## 2022-05-27 RX ORDER — LIDOCAINE HYDROCHLORIDE 10 MG/ML
INJECTION, SOLUTION EPIDURAL; INFILTRATION; INTRACAUDAL; PERINEURAL PRN
Status: DISCONTINUED | OUTPATIENT
Start: 2022-05-27 | End: 2022-05-27 | Stop reason: SDUPTHER

## 2022-05-27 RX ORDER — CEFAZOLIN SODIUM 1 G/3ML
INJECTION, POWDER, FOR SOLUTION INTRAMUSCULAR; INTRAVENOUS PRN
Status: DISCONTINUED | OUTPATIENT
Start: 2022-05-27 | End: 2022-05-27 | Stop reason: SDUPTHER

## 2022-05-27 RX ORDER — ONDANSETRON 2 MG/ML
4 INJECTION INTRAMUSCULAR; INTRAVENOUS
Status: DISCONTINUED | OUTPATIENT
Start: 2022-05-27 | End: 2022-05-27 | Stop reason: HOSPADM

## 2022-05-27 RX ORDER — BUPIVACAINE HYDROCHLORIDE 5 MG/ML
INJECTION, SOLUTION EPIDURAL; INTRACAUDAL
Status: DISCONTINUED | OUTPATIENT
Start: 2022-05-27 | End: 2022-05-27 | Stop reason: SDUPTHER

## 2022-05-27 RX ORDER — SODIUM CHLORIDE 0.9 % (FLUSH) 0.9 %
5-40 SYRINGE (ML) INJECTION PRN
Status: DISCONTINUED | OUTPATIENT
Start: 2022-05-27 | End: 2022-05-27 | Stop reason: HOSPADM

## 2022-05-27 RX ORDER — FENTANYL CITRATE 50 UG/ML
25 INJECTION, SOLUTION INTRAMUSCULAR; INTRAVENOUS EVERY 5 MIN PRN
Status: DISCONTINUED | OUTPATIENT
Start: 2022-05-27 | End: 2022-05-27 | Stop reason: HOSPADM

## 2022-05-27 RX ORDER — FENTANYL CITRATE 50 UG/ML
50 INJECTION, SOLUTION INTRAMUSCULAR; INTRAVENOUS ONCE
Status: DISCONTINUED | OUTPATIENT
Start: 2022-05-27 | End: 2022-05-27 | Stop reason: HOSPADM

## 2022-05-27 RX ORDER — MIDAZOLAM HYDROCHLORIDE 2 MG/2ML
1 INJECTION, SOLUTION INTRAMUSCULAR; INTRAVENOUS EVERY 10 MIN PRN
Status: DISCONTINUED | OUTPATIENT
Start: 2022-05-27 | End: 2022-05-27 | Stop reason: HOSPADM

## 2022-05-27 RX ORDER — CYCLOBENZAPRINE HCL 10 MG
10 TABLET ORAL 3 TIMES DAILY PRN
Qty: 30 TABLET | Refills: 0 | Status: SHIPPED | OUTPATIENT
Start: 2022-05-27 | End: 2022-06-06

## 2022-05-27 RX ORDER — ONDANSETRON 2 MG/ML
INJECTION INTRAMUSCULAR; INTRAVENOUS PRN
Status: DISCONTINUED | OUTPATIENT
Start: 2022-05-27 | End: 2022-05-27 | Stop reason: SDUPTHER

## 2022-05-27 RX ORDER — DOCUSATE SODIUM 100 MG/1
100 CAPSULE, LIQUID FILLED ORAL 2 TIMES DAILY
Qty: 20 CAPSULE | Refills: 0 | Status: SHIPPED | OUTPATIENT
Start: 2022-05-27 | End: 2022-05-27 | Stop reason: SDUPTHER

## 2022-05-27 RX ORDER — ACETAMINOPHEN 500 MG
1000 TABLET ORAL EVERY 6 HOURS PRN
Qty: 112 TABLET | Refills: 0 | Status: SHIPPED | OUTPATIENT
Start: 2022-05-27

## 2022-05-27 RX ORDER — KETOROLAC TROMETHAMINE 30 MG/ML
INJECTION, SOLUTION INTRAMUSCULAR; INTRAVENOUS PRN
Status: DISCONTINUED | OUTPATIENT
Start: 2022-05-27 | End: 2022-05-27 | Stop reason: SDUPTHER

## 2022-05-27 RX ORDER — DOCUSATE SODIUM 100 MG/1
100 CAPSULE, LIQUID FILLED ORAL 2 TIMES DAILY
Qty: 20 CAPSULE | Refills: 0 | Status: SHIPPED | OUTPATIENT
Start: 2022-05-27

## 2022-05-27 RX ORDER — GABAPENTIN 100 MG/1
100 CAPSULE ORAL 3 TIMES DAILY
Qty: 30 CAPSULE | Refills: 0 | Status: SHIPPED | OUTPATIENT
Start: 2022-05-27 | End: 2022-05-27 | Stop reason: SDUPTHER

## 2022-05-27 RX ORDER — OXYCODONE HYDROCHLORIDE 5 MG/1
5 TABLET ORAL
Status: COMPLETED | OUTPATIENT
Start: 2022-05-27 | End: 2022-05-27

## 2022-05-27 RX ADMIN — FENTANYL CITRATE 25 MCG: 50 INJECTION, SOLUTION INTRAMUSCULAR; INTRAVENOUS at 11:55

## 2022-05-27 RX ADMIN — FENTANYL CITRATE 25 MCG: 50 INJECTION, SOLUTION INTRAMUSCULAR; INTRAVENOUS at 10:36

## 2022-05-27 RX ADMIN — FENTANYL CITRATE 25 MCG: 50 INJECTION, SOLUTION INTRAMUSCULAR; INTRAVENOUS at 11:56

## 2022-05-27 RX ADMIN — ROCURONIUM BROMIDE 40 MG: 10 INJECTION INTRAVENOUS at 07:33

## 2022-05-27 RX ADMIN — FENTANYL CITRATE 25 MCG: 50 INJECTION, SOLUTION INTRAMUSCULAR; INTRAVENOUS at 11:57

## 2022-05-27 RX ADMIN — DEXAMETHASONE SODIUM PHOSPHATE 10 MG: 10 INJECTION INTRAMUSCULAR; INTRAVENOUS at 07:58

## 2022-05-27 RX ADMIN — FENTANYL CITRATE 100 MCG: 50 INJECTION, SOLUTION INTRAMUSCULAR; INTRAVENOUS at 07:33

## 2022-05-27 RX ADMIN — CEFAZOLIN 2000 MG: 1 INJECTION, POWDER, FOR SOLUTION INTRAMUSCULAR; INTRAVENOUS at 08:00

## 2022-05-27 RX ADMIN — SUGAMMADEX 200 MG: 100 INJECTION, SOLUTION INTRAVENOUS at 11:38

## 2022-05-27 RX ADMIN — PROPOFOL 150 MG: 10 INJECTION, EMULSION INTRAVENOUS at 07:33

## 2022-05-27 RX ADMIN — BUPIVACAINE HYDROCHLORIDE 30 ML: 5 INJECTION, SOLUTION EPIDURAL; INTRACAUDAL; PERINEURAL at 12:32

## 2022-05-27 RX ADMIN — Medication 50 MCG: at 07:46

## 2022-05-27 RX ADMIN — SODIUM CHLORIDE, POTASSIUM CHLORIDE, SODIUM LACTATE AND CALCIUM CHLORIDE: 600; 310; 30; 20 INJECTION, SOLUTION INTRAVENOUS at 09:23

## 2022-05-27 RX ADMIN — FENTANYL CITRATE 50 MCG: 50 INJECTION, SOLUTION INTRAMUSCULAR; INTRAVENOUS at 12:19

## 2022-05-27 RX ADMIN — ONDANSETRON 4 MG: 2 INJECTION INTRAMUSCULAR; INTRAVENOUS at 10:59

## 2022-05-27 RX ADMIN — OXYCODONE HYDROCHLORIDE 5 MG: 5 TABLET ORAL at 13:29

## 2022-05-27 RX ADMIN — GLYCOPYRROLATE 0.2 MG: 0.2 INJECTION INTRAMUSCULAR; INTRAVENOUS at 08:15

## 2022-05-27 RX ADMIN — MIDAZOLAM HYDROCHLORIDE 2 MG: 1 INJECTION, SOLUTION INTRAMUSCULAR; INTRAVENOUS at 12:09

## 2022-05-27 RX ADMIN — SODIUM CHLORIDE, POTASSIUM CHLORIDE, SODIUM LACTATE AND CALCIUM CHLORIDE: 600; 310; 30; 20 INJECTION, SOLUTION INTRAVENOUS at 06:42

## 2022-05-27 RX ADMIN — LIDOCAINE HYDROCHLORIDE 40 MG: 10 INJECTION, SOLUTION EPIDURAL; INFILTRATION; INTRACAUDAL; PERINEURAL at 07:33

## 2022-05-27 RX ADMIN — KETOROLAC TROMETHAMINE 30 MG: 30 INJECTION, SOLUTION INTRAMUSCULAR at 11:11

## 2022-05-27 ASSESSMENT — PAIN SCALES - GENERAL
PAINLEVEL_OUTOF10: 6
PAINLEVEL_OUTOF10: 10
PAINLEVEL_OUTOF10: 6
PAINLEVEL_OUTOF10: 10

## 2022-05-27 ASSESSMENT — PAIN SCALES - WONG BAKER
WONGBAKER_NUMERICALRESPONSE: 0

## 2022-05-27 ASSESSMENT — PAIN DESCRIPTION - FREQUENCY: FREQUENCY: CONTINUOUS

## 2022-05-27 ASSESSMENT — PAIN DESCRIPTION - LOCATION
LOCATION: KNEE

## 2022-05-27 ASSESSMENT — PAIN - FUNCTIONAL ASSESSMENT: PAIN_FUNCTIONAL_ASSESSMENT: 0-10

## 2022-05-27 ASSESSMENT — PAIN DESCRIPTION - ONSET: ONSET: AWAKENED FROM SLEEP

## 2022-05-27 ASSESSMENT — PAIN DESCRIPTION - DESCRIPTORS
DESCRIPTORS: BURNING
DESCRIPTORS: THROBBING

## 2022-05-27 ASSESSMENT — PAIN DESCRIPTION - PAIN TYPE: TYPE: SURGICAL PAIN

## 2022-05-27 ASSESSMENT — PAIN DESCRIPTION - ORIENTATION
ORIENTATION: LEFT

## 2022-05-27 NOTE — OP NOTE
Operative Note      Patient: Adrianne Link  YOB: 1995  MRN: [id number]    Date of Procedure: 5/27/2022    Pre-Op Diagnosis: LEFT ACL TEAR    Post-Op Diagnosis: Same       Procedure(s):  Left knee ANTERIOR CRUCIATE LIGAMENT reconstruction with BTB patellar autograft and internal brace    Surgeon(s):  Beatrice Vazquez MD    Assistant:   Resident: Denis Madden DO; Logan Thornton DO; Radha Hyde DO    Anesthesia: General    Estimated Blood Loss (mL): 50 cc    TT: 375 mins    Complications: None    Specimens:   * No specimens in log *    Implants:  Implant Name Type Inv. Item Serial No.  Lot No. LRB No. Used Action   DEVICE GRFT FIX 4.75X19.1 MM BIOCOMPOSITE SWIVELOCK - [id number]  DEVICE GRFT FIX 4.75X19.1 MM BIOCOMPOSITE SWIVELOCK  [employer] [id number] Left 1 Implanted   SYSTEM FIX BNE TEND BNE W/ 10MM FLIPCUTTER II TIGHTROPE - [id number]  SYSTEM FIX BNE TEND BNE W/ 10MM FLIPCUTTER II TIGHTROPE  [employer] [id number] Left 1 Implanted   SCREW INTFR L20MM DIA8MM VENT BIOCOMPOSITE FASTTHREAD - [id number]  SCREW INTFR L20MM DIA8MM VENT BIOCOMPOSITE FASTTHREAD  [employer] [id number] Left 1 Implanted         Drains: * No LDAs found *    OPERATIVE REPORT    Surgical Indications:  Adrianne Link is a 27 y.o. old female who presented with left knee ANTERIOR CRUCIATE LIGAMENT tear. Following a discussion with the patient regarding both non-operative and operative treatment options, they consented to proceed with left knee anterior cruciate ligament reconstruction. She came to this decision after demonstrating an understanding of our discussion regarding details of the procedure, risks and benefits, expected outcome, and postoperative course. Operative technique: On the day of surgery the patient was met in the preoperative unit where written consent was obtained and the operative site was marked with a permanent marker.  The patient was then wheeled back to the operating suite and laid in the supine position on the OR table. A well padded non-sterile tourniquet was applied to the left lower extremity. The leg was placed into a well padded arthroscopic leg pompa. Appropriate antibiotics were being infused at this time. Patient underwent induction of anesthesia and endotracheal intubation. A time out was held and after all members were in agreement we moved forward with surgery. Prior to preparation, the knee was examined under anesthesia and found to have a positive anterior drawer, lachman, and pivot shift test, confirming ACL tear clinically. There was also concern for a PLC injury but examination under full anesthesia showed stability in varus stress and a supine dial test.     After standard sterile preparation and draping of the left lower extremity was complete, anatomical landmarks were drawn out, and a #11 blade was used to make the lateral portal incision. Blunt dissection was taken down through subcutaneous tissue and through capsule with hemostats. The blunt obturator and trochar were then inserted into the joint in flexion and the knee was taken into extension to enter the suprapatellar pouch. Obturator was removed and the arthroscope was inserted. We then began the diagnostic arthroscopy. We began by examining the suprapatellar pouch. No loose bodies or large plica bands were identified. Next we visualized the patellofemoral joint. There was no appreciable chondromalacia. Patellofemoral tracking was analyzed and found to appear to be normal.     Next we entered the lateral then medial gutters and identified no loose bodies or large plica bands. We then entered the medial joint. At this time an 18 gauge spinal needle was used to determine the starting point for the medial portal. #11 blade was used to make the incision and the blunt obturator was used to enter the joint. The probe was then inserted into the joint.     We examined the medial joint including the meniscus as well as articular cartilage. The meniscus was found to be stable to probing without pathologic excursion. There were no soft spots or chondromalacia identified on the cartilaginous surfaces. Next the ACL and PCL were examined. Remnant fibers of the ACL stump were identified at both its origin and insertion with no contiguous intact fibers remaining in the notch. At this time the scope was pulled out and we began our harvest portion of case. Approximately 10 cm incision overlying just medial to the patella tendon and tibial tubercle was made. Careful dissection was brought to the level of the peritenon. The peritenon was then incised in a longitudinal fashion encompassing the inferior third of the patella, patella tendon, tibial tubercle in a concise fashion. The peritenon was elevated off the patellar tendon to the delineate the medial lateral edges. Following this, a 9 mm double blade cutter was used to create full-thickness tendon graft cut. We then created a 20 x 10 mm patellar/tibial tubercle bone cuts utilizing a sagittal saw. The graft was then prepared with an internal brace in a standard fashion at the back table. Once this was performed, an arthroscopic shaver and Ar[employer]Care ablation wand were both used to debride the notch and tibial plateau of the stump tissue so that a clear path was identified for placement of our tunnels. A notchplasty was not performed. The PCL was found to be stable upon probing. We examined the lateral joint including the meniscus as well as articular cartilage. The meniscus was found to be stable to probing without pathologic excursion. There were no soft spots or chondromalacia identified on the cartilaginous surfaces. At this time we began with creating our femoral tunnel. The Arthrex femoral tunnel guide was inserted through the lateral portal and placed in the appropriate position on the medial wall of the lateral femoral condyle within the notch.  A 2cm incision was made on the distal anterolateral femur to allow insertion of our guide pin. Blunt dissection was taken down through the IT band until femur was identified. The guide pin was then inserted to establish correct trajectory. We utilized the flipcutter to drill a 9.5 mm diameter hole that was 25 mm in length. We then inserted a passing suture through the femoral tunnel for later use. We now began tibial tunnel preparation. The arthrex tibial tunnel guide was inserted through the medial portal and placed in the appropriate position on the tibial plateau. We then inserted the guide pin to establish correct trajectory. At this time a 9.5mm drill was used to drill the tibial tunnel. Arthroscopic shaver was used to debride tunnel edges. The passing suture was then passed through tibial tunnel. The fully prepared graft was then inserted through the tibial tunnel and pulled into the notch. The button was pulled through femur, flipped, and back tension was pulled to ensure proper positioning of the button. Once this was complete, the graft was held under tension and knee was taken through full range of motion 25 times. With continued tension, we then inserted a swivel lock Arthrex anchor 1 cm distal to the tibial tunnel locking in our internal brace while tensioned in full knee extension. We then inserted a nitinol wire at the tibial tunnel and a 8 mm interference screw was placed and fully seated. The knee was examined under anesthesia and found to have a stable lachman/anterior drawer test, confirming successful reconstruction. At this time all wounds were thoroughly irrigated and tourniquet was let down for a total time of 130 minutes. At this time all fluid was extracted from the joint, instruments removed, and closure performed. Portal sites were closed with 3-0 monocryl. We then closed the tibial and patellar incisions with 0-vicryl, 2-0 vicryl and 3-0 monocryl.   Sterile adaptic, 4x4s, ABDs, webril, and an ACE bandage were applied as dressing. A hinged knee brace was also applied. Anesthesia was reversed and the patient was extubated without complication. The patient was moved back over to the hospital bed and wheeled to the recovery unit in stable condition. Dr. Angelique Porras was present for and active in all critical aspects of the case.      Electronically signed by Viktoriya Aceves DO on 5/27/2022 at 11:09 AM

## 2022-05-27 NOTE — PROGRESS NOTES
RN let Dr. Monico Nageotte know that pt stated that yesterday when she went home after block she fell in her kitchen. Pt stating that she did not hurt anything or hit anything to this RN.

## 2022-05-27 NOTE — ANESTHESIA PROCEDURE NOTES
Peripheral Block    Patient location during procedure: post-op  Start time: 5/27/2022 12:32 PM  End time: 5/27/2022 12:36 PM  Staffing  Performed: anesthesiologist   Anesthesiologist: Chris Reyez MD  Preanesthetic Checklist  Completed: patient identified, IV checked, site marked, risks and benefits discussed, surgical/procedural consents, equipment checked, pre-op evaluation, timeout performed, anesthesia consent given, oxygen available and monitors applied/VS acknowledged  Peripheral Block  Patient position: supine  Prep: ChloraPrep  Patient monitoring: cardiac monitor, continuous pulse ox, frequent blood pressure checks and IV access  Block type: Femoral  Laterality: left  Injection technique: single-shot  Guidance: ultrasound guided  Local infiltration: lidocaine  Infiltration strength: 1 %  Dose: 3 mL  Approach to block: Low Femoral.  Provider prep: mask and sterile gloves  Local infiltration: lidocaine  Needle  Needle type: short-bevel   Needle gauge: 21 G  Needle length: 10 cm  Needle localization: ultrasound guidance  Needle insertion depth: 3 cm  Test dose: negative  Assessment  Injection assessment: negative aspiration for heme, no paresthesia on injection and local visualized surrounding nerve on ultrasound  Paresthesia pain: none  Slow fractionated injection: yes  Hemodynamics: stablepermanent images obtainedOutcomes: uncomplicated and patient tolerated procedure well  Additional Notes  U/S [id number].  (1) Under ultrasound guidance, a 21 gauge needle was inserted and placed in close proximity to the abd. nerve.  (2) Ultrasound was also used to visualize the spread of the anesthetic in close proximity to the nerve being blocked. (3) The nerve appeared anatomically normal, and (4 there were no apparent abnormal pathological findings on the image that were readily visible and related to the nerve being blocked. (5) A permanent ultrasound image was saved in the patient's record.             Medications Administered  Bupivacaine (MARCAINE) PF injection 0.5%, 30 mL  Reason for block: post-op pain management and at surgeon's request

## 2022-05-27 NOTE — ANESTHESIA POSTPROCEDURE EVALUATION
Department of Anesthesiology  Postprocedure Note    Patient: Susan Clifford  MRN: [id number]  YOB: 1995  Date of evaluation: 5/27/2022  Time:  1:40 PM     Procedure Summary     Date: 05/27/22 Room / Location: [address]    Anesthesia Start: 0730 Anesthesia Stop: 1200    Procedure: ACL RECONSTRUCTION WITH BONE TENDON BONE AUTOGRAFT (Left Knee) Diagnosis:       Rupture of anterior cruciate ligament of left knee, subsequent encounter      (LEFT ACL TEAR)    Surgeons: Nathan Barragan MD Responsible Provider: Mateo Corbin MD    Anesthesia Type: general, regional ASA Status: 2          Anesthesia Type: No value filed. Debby Phase I: Debby Score: 8    Debby Phase II:      Last vitals: Reviewed and per EMR flowsheets.        Anesthesia Post Evaluation    Patient location during evaluation: PACU  Patient participation: complete - patient participated  Level of consciousness: awake and alert  Pain score: 0  Airway patency: patent  Nausea & Vomiting: no nausea and no vomiting  Complications: no  Cardiovascular status: hemodynamically stable  Respiratory status: acceptable  Hydration status: stable

## 2022-05-27 NOTE — BRIEF OP NOTE
Brief Postoperative Note      Patient: Farhana Infante  YOB: 1995  MRN: [id number]    Date of Procedure: 5/27/2022    Pre-Op Diagnosis: LEFT ACL TEAR    Post-Op Diagnosis: Same       Procedure(s):  ACL RECONSTRUCTION WITH BONE TENDON BONE AUTOGRAFT    Surgeon(s):  Regina Mcdonald MD    Assistant:  Resident: Angeles Arnold DO; Sang Ramos DO; Suzan Cochran DO    Anesthesia: General    Estimated Blood Loss (mL): 50 mL's    Fluids: 1300 cc crystalloid    UOP: 450 cc's    Tourniquet Time: 130 minutes at 788 mmHg    Complications: None    Specimens: None    Implants:  Implant Name Type Inv. Item Serial No.  Lot No. LRB No. Used Action   DEVICE GRFT FIX 4.75X19.1 MM BIOCOMPOSITE SWIVELOCK - [id number]  DEVICE GRFT FIX 4.75X19.1 MM BIOCOMPOSITE SWIVELOCK  [employer] [id number] Left 1 Implanted   SYSTEM FIX BNE TEND BNE W/ 10MM FLIPCUTTER II TIGHTROPE - [id number]  SYSTEM FIX BNE TEND BNE W/ 10MM FLIPCUTTER II TIGHTROPE  [employer] [id number] Left 1 Implanted   SCREW INTFR L20MM DIA8MM VENT BIOCOMPOSITE FASTTHREAD - [id number]  SCREW INTFR L20MM DIA8MM VENT BIOCOMPOSITE FASTTHREAD  [employer] [id number] Left 1 Implanted     Drains: None    Findings: Left ACL tear, see op note for full details.     Electronically signed by Angeles Arnold DO on 5/27/2022 at 11:34 AM

## 2022-05-27 NOTE — ANESTHESIA PRE PROCEDURE
Department of Anesthesiology  Preprocedure Note       Name:  Dalia May   Age:  27 y. o.  :  1995                                          MRN:  [id number]         Date:  2022      Surgeon: Pepper Loera):  Parker Aparicio MD    Procedure: Procedure(s):  ARTHROSCOPIC KNEE PCL REPAIR,  ACL RECONSTRUCTION WITH BONE TENDON BONE AUTOGRAFT  ([address], FEMORAL NERVE BLOCK PRE-OP, 3080 TABLE, SUPINE)    Medications prior to admission:   Prior to Admission medications    Medication Sig Start Date End Date Taking? Authorizing Provider   levothyroxine (SYNTHROID) 100 MCG tablet Take 50 mcg by mouth Daily     Historical Provider, MD   amphetamine-dextroamphetamine (ADDERALL XR) 10 MG extended release capsule Take 10 mg by mouth every morning. Historical Provider, MD       Current medications:    No current facility-administered medications for this encounter. Allergies:  No Known Allergies    Problem List:    Patient Active Problem List   Diagnosis Code    Dislocation of left patella S83.005A       Past Medical History:        Diagnosis Date    ACL tear 2022    LT   Dr. Acacia Pinzon Thyroid disease     Wears contact lenses     Wears glasses     Wellness examination     PCP Dr Estevan Hoang /  [place] / last visit unknown       Past Surgical History:  History reviewed. No pertinent surgical history. Social History:    Social History     Tobacco Use    Smoking status: Never Smoker    Smokeless tobacco: Never Used   Substance Use Topics    Alcohol use: Not Currently                                Counseling given: Not Answered      Vital Signs (Current): There were no vitals filed for this visit.                                            BP Readings from Last 3 Encounters:   22 117/74   22 120/77       NPO Status:                                                                                 BMI:   Wt Readings from Last 3 Encounters:   22 148 lb (67.1 kg)   22 150 lb (68 kg) 04/27/22 150 lb (68 kg)     There is no height or weight on file to calculate BMI.    CBC:   Lab Results   Component Value Date    WBC 9.0 03/06/2013    RBC 4.51 03/06/2013    HGB 12.5 03/06/2013    HCT 37.7 03/06/2013    MCV 83.7 03/06/2013    RDW 14.9 03/06/2013     03/06/2013       CMP:   Lab Results   Component Value Date     03/06/2013    K 4.0 03/06/2013     03/06/2013    CO2 29 03/06/2013    BUN 16 03/06/2013    CREATININE 0.88 03/06/2013    GFRAA NOT REPORTED 03/06/2013    LABGLOM  03/06/2013     Pediatric GFR requires additional information. Refer to [facility] website for       POC Tests: No results for input(s): POCGLU, POCNA, POCK, POCCL, POCBUN, POCHEMO, POCHCT in the last 72 hours. Coags: No results found for: PROTIME, INR, APTT    HCG (If Applicable):   Lab Results   Component Value Date    HCG NEGATIVE 05/26/2022        ABGs: No results found for: PHART, PO2ART, UMQ8TFL, QIV5WCR, BEART, D6TPUFFK     Type & Screen (If Applicable):  No results found for: LABABO, LABRH    Drug/Infectious Status (If Applicable):  No results found for: HIV, HEPCAB    COVID-19 Screening (If Applicable): No results found for: COVID19        Anesthesia Evaluation  Patient summary reviewed no history of anesthetic complications:   Airway: Mallampati: II  TM distance: >3 FB   Neck ROM: full  Mouth opening: > = 3 FB   Dental:          Pulmonary:Negative Pulmonary ROS and normal exam                               Cardiovascular:Negative CV ROS            Rhythm: regular  Rate: normal                    Neuro/Psych:   Negative Neuro/Psych ROS              GI/Hepatic/Renal: Neg GI/Hepatic/Renal ROS            Endo/Other: Negative Endo/Other ROS                    Abdominal:             Vascular: negative vascular ROS. Other Findings:           Anesthesia Plan      general and regional     ASA 2       Induction: intravenous. Anesthetic plan and risks discussed with patient.       Plan discussed with Aurelio Colvin MD   5/27/2022

## 2022-05-27 NOTE — H&P
Pt Name: Kayden Walker  MRN: [id number]  YOB: 1995  Date of evaluation: 5/27/2022    I have reviewed the patient's history and physical examination completed on 5/26/2022. No changes to history or on examination today, unless noted below. Patient reports bilateral leg numbness and imbalance, as she was given a peripheral block yesterday in preop prior to reschedule of surgery and discharge. Patient confirms she was provided non-weight bearing instructions prior to discharge by staff and has been using her crutches to ambulate. See original H&P copied below. Segundo Parker APRN - CNP  5/27/22  6:51 AM     Surgical History and Physical Exam    Reason for surgery:  The patient is a 27 y.o. female with left anterior cruciate ligament injury     Past Medical History:    Past Medical History:   Diagnosis Date    ACL tear 05/2022    LT   Dr. Andreina Diaz Wears contact lenses     Wears glasses     Wellness examination     PCP Dr Wilmer Henderson /  [place] / last visit unknown     Past Surgical History:    History reviewed. No pertinent surgical history. Medications Prior to Admission:   Prior to Admission medications    Medication Sig Start Date End Date Taking? Authorizing Provider   levothyroxine (SYNTHROID) 100 MCG tablet Take 50 mcg by mouth Daily     Historical Provider, MD   amphetamine-dextroamphetamine (ADDERALL XR) 10 MG extended release capsule Take 10 mg by mouth every morning. Historical Provider, MD     Allergies:    Patient has no known allergies.   Social History:   Social History     Socioeconomic History    Marital status: Single     Spouse name: None    Number of children: None    Years of education: None    Highest education level: None   Occupational History    None   Tobacco Use    Smoking status: Never Smoker    Smokeless tobacco: Never Used   Vaping Use    Vaping Use: Never used   Substance and Sexual Activity    Alcohol use: Not Currently    Drug use: Never    Sexual activity: None   Other Topics Concern    None   Social History Narrative    None     Social Determinants of Health     Financial Resource Strain:     Difficulty of Paying Living Expenses: Not on file   Food Insecurity:     Worried About Running Out of Food in the Last Year: Not on file    Daniel of Food in the Last Year: Not on file   Transportation Needs:     Lack of Transportation (Medical): Not on file    Lack of Transportation (Non-Medical): Not on file   Physical Activity:     Days of Exercise per Week: Not on file    Minutes of Exercise per Session: Not on file   Stress:     Feeling of Stress : Not on file   Social Connections:     Frequency of Communication with Friends and Family: Not on file    Frequency of Social Gatherings with Friends and Family: Not on file    Attends [employer] Services: Not on file    Active Member of [address] [place] [address] [employer] or Organizations: Not on file    Attends Club or Organization Meetings: Not on file    Marital Status: Not on file   Intimate Partner Violence:     Fear of Current or Ex-Partner: Not on file    Emotionally Abused: Not on file    Physically Abused: Not on file    Sexually Abused: Not on file   Housing Stability:     Unable to Pay for Housing in the Last Year: Not on file    Number of [place] in the Last Year: Not on file    Unstable Housing in the Last Year: Not on file     Family History:  Family History   Problem Relation Age of Onset    No Known Problems Mother     High Blood Pressure Father     Arthritis Father        REVIEW OF SYSTEMS:  Constitutional: Negative for fever and chills. Musculoskeletal: Positive for myalgias and joint pain. Skin: Negative for itching and rash. Neurological: Negative for dizziness, sensory change and headaches. Psychiatric/Behavioral: Negative for depression and suicidal ideas.         PHYSICAL EXAM:  Blood pressure 110/77, pulse 79, temperature 96.8 °F (36 °C), temperature source Temporal, resp. rate 22, height 5' 3\" (1.6 m), weight 148 lb (67.1 kg), SpO2 98 %. Gen: alert and oriented, NAD, cooperative  Head: normocephalic atraumatic   Cardiovascular: Regular rate, no dependent edema, distal pulses 2+  Respiratory: Chest symmetric, no accessory muscle use, normal respirations  MSK: Left lower extremity: tenderness to palpation about left knee. N-V intact distally    A/P: 27 y.o. female  was evaluated and after discussion surgical and non surgical options, the patient has decided to undergo left anterior cruciate ligament reconstructions. Consent obtained and in chart. All questions answered appropriately. Surgical risks including but not limited to: bleeding, blood clots, infection, damage to surrounding tissues/nerves/vessels, failure of fixation, failure of wounds to heal, loss of motion, stiffness, dislocation, postoperative pain, recurrence of symptoms, need for future surgery,  risks of anesthesia, loss of limb and loss of life were all discussed with the patient. Knowing these risks, the patient wishes to proceed with surgery. -Abx OCTOR  -Site marked, Consent in chart  -AC held  -NPO since midnight  -All question answered.

## 2022-06-08 ENCOUNTER — OFFICE VISIT (OUTPATIENT)
Dept: ORTHOPEDIC SURGERY | Age: 27
End: 2022-06-08

## 2022-06-08 VITALS — WEIGHT: 148 LBS | BODY MASS INDEX: 26.22 KG/M2 | HEIGHT: 63 IN

## 2022-06-08 DIAGNOSIS — S83.512D COMPLETE TEAR OF ANTERIOR CRUCIATE LIGAMENT OF LEFT KNEE, SUBSEQUENT ENCOUNTER: Primary | ICD-10-CM

## 2022-06-08 PROCEDURE — 99024 POSTOP FOLLOW-UP VISIT: CPT | Performed by: ORTHOPAEDIC SURGERY

## 2022-06-08 NOTE — PROGRESS NOTES
This patient had undergone anterior cruciate ligament reconstruction on 5/27/2022 is seen here in follow-up. Patient is continue to use the hinged brace. Today she was able to do straight leg raising and was able to almost flex up to 90 degrees. Incision remains well-healed. Diagnosis: Status post ACL reconstruction left knee. Treatment: She can be weightbearing use the knee brace that she has. She may discontinue the hinged brace. She will start mobilizing and also do quad strengthening exercises and we will see her again in 1 week's time and if she is doing well then may allow her to return to work. I did above her to undergo to walk with the brace on but she says that  with the brace on she would not be allowed to return to work.

## 2022-06-14 ENCOUNTER — OFFICE VISIT (OUTPATIENT)
Dept: ORTHOPEDIC SURGERY | Age: 27
End: 2022-06-14

## 2022-06-14 DIAGNOSIS — S83.512D COMPLETE TEAR OF ANTERIOR CRUCIATE LIGAMENT OF LEFT KNEE, SUBSEQUENT ENCOUNTER: Primary | ICD-10-CM

## 2022-06-14 PROCEDURE — 99024 POSTOP FOLLOW-UP VISIT: CPT | Performed by: ORTHOPAEDIC SURGERY

## 2022-06-14 NOTE — PROGRESS NOTES
Patient who underwent ACL reconstruction on 5/27/2022 is seen here in follow-up. The patient states he is definitely improving. She is able to go up and down the stairs without too much difficulty now and is able to walk better. She is not using any brace. She had been trying to do the squatting advised her against it. She was having pain in the anterior aspect of the knee when doing that. She also had numbness over the lateral part of the knee lateral to the distal part of the incision. I told her this is from the infrapatellar branch and it may or may not return. Examination shows incision well-healed. She has full extension but flexes only up to about 80 degrees. I suggested physical therapy but she says she can do that all these exercises at home on her own. Demonstrated increasing flexion exercises. Patient would like to return to work tomorrow and the appropriate forms were filled out for her. Have instructed her and increase in flexion exercises. We will see her again in a week's time for clinical assessment.

## 2022-06-21 ENCOUNTER — OFFICE VISIT (OUTPATIENT)
Dept: ORTHOPEDIC SURGERY | Age: 27
End: 2022-06-21

## 2022-06-21 VITALS — HEIGHT: 63 IN | BODY MASS INDEX: 26.22 KG/M2 | WEIGHT: 148 LBS

## 2022-06-21 DIAGNOSIS — S83.512D COMPLETE TEAR OF ANTERIOR CRUCIATE LIGAMENT OF LEFT KNEE, SUBSEQUENT ENCOUNTER: Primary | ICD-10-CM

## 2022-06-21 PROCEDURE — 99024 POSTOP FOLLOW-UP VISIT: CPT | Performed by: ORTHOPAEDIC SURGERY

## 2022-06-21 NOTE — PROGRESS NOTES
Patient had undergone ACL reconstruction is seen here to prove check on her progress. Today examination shows no effusion full extension and flexion up to about 95 200 degrees. She is back at work and she will continue her own therapy and will see her in 2 weeks.

## 2022-07-01 ENCOUNTER — NURSE TRIAGE (OUTPATIENT)
Dept: OTHER | Facility: CLINIC | Age: 27
End: 2022-07-01

## 2022-07-05 ENCOUNTER — OFFICE VISIT (OUTPATIENT)
Dept: ORTHOPEDIC SURGERY | Age: 27
End: 2022-07-05

## 2022-07-05 VITALS — HEIGHT: 63 IN | BODY MASS INDEX: 26.22 KG/M2 | WEIGHT: 148 LBS

## 2022-07-05 DIAGNOSIS — S83.512D COMPLETE TEAR OF ANTERIOR CRUCIATE LIGAMENT OF LEFT KNEE, SUBSEQUENT ENCOUNTER: Primary | ICD-10-CM

## 2022-07-05 PROBLEM — S83.512A COMPLETE TEAR OF ANTERIOR CRUCIATE LIGAMENT OF LEFT KNEE: Status: ACTIVE | Noted: 2022-07-05

## 2022-07-05 PROCEDURE — 99024 POSTOP FOLLOW-UP VISIT: CPT | Performed by: ORTHOPAEDIC SURGERY

## 2022-07-05 NOTE — PROGRESS NOTES
Chief Complaint   Patient presents with    Follow-up     s/p - LEFT ACL REPAIR : 05/27/2022   Patient who underwent ACL reconstruction is doing quite well. She is actually back to working. And in fact on 1 occasion there was an emergency in the ER and she was found running. He did not have any problem. Examination: She has no effusion. X-rays of the ACL appears tighter on the left side than the right side. She has probably less than 5 degrees of extension lag. She flexes as much as the other side in supine position actively. Diagnosis: Status post ACL reconstruction left knee. Treatment: Continue her own physical therapy and we will see her again in 6 weeks.

## 2022-08-16 ENCOUNTER — OFFICE VISIT (OUTPATIENT)
Dept: ORTHOPEDIC SURGERY | Age: 27
End: 2022-08-16

## 2022-08-16 VITALS — HEIGHT: 63 IN | WEIGHT: 148 LBS | BODY MASS INDEX: 26.22 KG/M2

## 2022-08-16 DIAGNOSIS — S83.512D COMPLETE TEAR OF ANTERIOR CRUCIATE LIGAMENT OF LEFT KNEE, SUBSEQUENT ENCOUNTER: Primary | ICD-10-CM

## 2022-08-16 PROCEDURE — 99024 POSTOP FOLLOW-UP VISIT: CPT | Performed by: ORTHOPAEDIC SURGERY

## 2022-08-16 NOTE — PROGRESS NOTES
This patient who had undergone the ACL reconstruction is seen here in follow-up. The patient has no symptoms of pain. She says sometimes she feels a bit unsteady. Examination: Her gait and stance were completely normal.  She has full extension and lacks about less than 10 degrees of flexion. The knee is actually more stable on [place] on the left side than the right side. She is: Status post ACL reconstruction left knee. Treatment: Return to full activities we will see her as needed.

## 2024-12-04 NOTE — TELEPHONE ENCOUNTER
Location of employment: [address] [place] [address]    Department where injury occurred: ED hallway    Location of injury (body part involved): landed on sacrum and back but denies injury    Time of injury: 2121 on 6/30/22    Last 4 of SSN: [id number]    Subjective: Caller states she was walking in the ED hallway and slipped on a wet mopped floor. Landed on sacrum and back. Doesn't remember if she hit her head or not because she was more worries about a patient. Denies any pain. Denies any bruising, swelling, knots, open areas. Denies any loss of consciousness. Current Symptoms: No symptoms    Pain Severity: 0/10    Recommended disposition: Home Care    Care advice provided, caller verbalizes understanding; denies any other questions or concerns. [facility] has been completed prior to call.      Reason for Disposition   [1] Recent fall AND [2] no injury    Protocols used: FALLS AND FALLING-ADULT-
spouse

## (undated) DEVICE — THIN OFFSET (9.0 X 0.38 X 25.0MM)

## (undated) DEVICE — TOWEL,OR,DSP,ST,NATURAL,DLX,4/PK,20PK/CS: Brand: MEDLINE

## (undated) DEVICE — DRAPE,REIN 53X77,STERILE: Brand: MEDLINE

## (undated) DEVICE — SUTURE ETHBND EXCEL SZ 2 L30IN NONABSORBABLE GRN L40MM V-37 MX69G

## (undated) DEVICE — BIT DRL DIA95MM CANN W CALIB DEPTH MRK FOR STD TWO INCIS

## (undated) DEVICE — SUTURE NONABSORBABLE  MERSILINE TP1 SIZE 5

## (undated) DEVICE — GLOVE EXAM M L95IN FNGR THK35MIL PALM THK24MIL OFF WHT

## (undated) DEVICE — THE STERILE LIGHT HANDLE COVER IS USED WITH STERIS SURGICAL LIGHTING AND VISUALIZATION SYSTEMS.

## (undated) DEVICE — MAT FLOOR ULTRA ABS 28X48IN

## (undated) DEVICE — SUTURE FIBERLOOP SZ 2-0 L20IN NONABSORBABLE BLU STR NDL AR7234

## (undated) DEVICE — DRAPE,U/ SHT,SPLIT,PLAS,STERIL: Brand: MEDLINE

## (undated) DEVICE — PATIENT RETURN ELECTRODE, SINGLE-USE, CONTACT QUALITY MONITORING, ADULT, WITH 9FT CORD, FOR PATIENTS WEIGING OVER 33LBS. (15KG): Brand: MEGADYNE

## (undated) DEVICE — WAND REPROC COBLATION AMBIENT SUPER TURBOVAC 90 IFS 3.75MM

## (undated) DEVICE — CONNECTOR,TUBING,5-IN-1,NON-STERILE: Brand: MEDLINE INDUSTRIES, INC.

## (undated) DEVICE — STERLING XTRASHARP SHAVER GREAT WHITE SHAVER BLADE, 4.2 MM: Brand: STERLING XTRASHARP SHAVER GREAT WHITE

## (undated) DEVICE — DRILL SURG FLIPCUTTER III

## (undated) DEVICE — GLOVE ORANGE PI 8 1/2   MSG9085

## (undated) DEVICE — INTENDED FOR TISSUE SEPARATION, AND OTHER PROCEDURES THAT REQUIRE A SHARP SURGICAL BLADE TO PUNCTURE OR CUT.: Brand: BARD-PARKER ® CARBON RIB-BACK BLADES

## (undated) DEVICE — YANKAUER,FLEXIBLE HANDLE,REGLR CAPACITY: Brand: MEDLINE INDUSTRIES, INC.

## (undated) DEVICE — TUBING, SUCTION, 9/32" X 20', STRAIGHT: Brand: MEDLINE INDUSTRIES, INC.

## (undated) DEVICE — Device

## (undated) DEVICE — GOWN,SIRUS,NONRNF,SETINSLV,XL,20/CS: Brand: MEDLINE

## (undated) DEVICE — SYRINGE IRRIG 60ML SFT PLIABLE BLB EZ TO GRP 1 HND USE W/

## (undated) DEVICE — GOWN,AURORA,NONREINFORCED,LARGE: Brand: MEDLINE

## (undated) DEVICE — ADHESIVE SKIN CLOSURE TOP 36 CC HI VISC DERMBND MINI

## (undated) DEVICE — 4.5 MM INCISOR PLUS ELITE STRAIGHT                                    DISPOSABLE BLADES, SLATE,PACKAGED 6                                    PER BOX, STERILE

## (undated) DEVICE — POUCH INSTR W6.75XL11.5IN FRST 2 PKT ADH FOR ORTH AND

## (undated) DEVICE — APPLICATOR MEDICATED 26 CC SOLUTION HI LT ORNG CHLORAPREP

## (undated) DEVICE — SPONGE LAP W18XL18IN WHT COT 4 PLY FLD STRUNG RADPQ DISP ST

## (undated) DEVICE — YANKAUER,POOLE TIP,STERILE,50/CS: Brand: MEDLINE

## (undated) DEVICE — SOLUTION IRRIG 3000ML 0.9% SOD CHL USP UROMATIC PLAS CONT

## (undated) DEVICE — GLOVE ORANGE PI 7 1/2   MSG9075

## (undated) DEVICE — GLOVE SURG SZ 65 THK91MIL LTX FREE SYN POLYISOPRENE

## (undated) DEVICE — GLOVE ORANGE PI 7   MSG9070

## (undated) DEVICE — KIT INSTR TRANSTIBIAL CRUCE W/O SAW BLDE DISP

## (undated) DEVICE — NEPTUNE E-SEP SMOKE EVACUATION PENCIL, COATED, 70MM BLADE, PUSH BUTTON SWITCH: Brand: NEPTUNE E-SEP

## (undated) DEVICE — SUTURE VIC + ABS BR UD X1 2-0 27IN VCP459H

## (undated) DEVICE — ZIMMER® STERILE DISPOSABLE TOURNIQUET CUFF WITH PROTECTIVE SLEEVE AND PLC, DUAL PORT, SINGLE BLADDER, 34 IN. (86 CM)

## (undated) DEVICE — MARKER,SKIN,WI/RULER AND LABELS: Brand: MEDLINE

## (undated) DEVICE — SUTURE FIBERSNARE 2 L26IN NONABSORBABLE GRN L12IN FIBERWIRE AR7209SN